# Patient Record
(demographics unavailable — no encounter records)

---

## 2024-11-12 NOTE — ASSESSMENT
[Diabetes Foot Care] : diabetes foot care [Carbohydrate Consistent Diet] : carbohydrate consistent diet [Importance of Diet and Exercise] : importance of diet and exercise to improve glycemic control, achieve weight loss and improve cardiovascular health [Exercise/Effect on Glucose] : exercise/effect on glucose [Hypoglycemia Management] : hypoglycemia management [Action and use of Insulin] : action and use of short and long-acting insulin [Self Monitoring of Blood Glucose] : self monitoring of blood glucose [Insulin Self-Administration] : insulin self-administration

## 2024-11-12 NOTE — ASSESSMENT
[Diabetes Foot Care] : diabetes foot care [Carbohydrate Consistent Diet] : carbohydrate consistent diet [Importance of Diet and Exercise] : importance of diet and exercise to improve glycemic control, achieve weight loss and improve cardiovascular health [Exercise/Effect on Glucose] : exercise/effect on glucose [Hypoglycemia Management] : hypoglycemia management [Action and use of Insulin] : action and use of short and long-acting insulin [Self Monitoring of Blood Glucose] : self monitoring of blood glucose [Insulin Self-Administration] : insulin self-administration Alert and oriented x 3, normal mood and affect, no apparent risk to self or others.

## 2024-11-14 NOTE — PHYSICAL EXAM
[No Acute Distress] : no acute distress [Normal Sclera/Conjunctiva] : normal sclera/conjunctiva [Normal Outer Ear/Nose] : the ears and nose were normal in appearance [Thyroid Not Enlarged] : the thyroid was not enlarged [No Thyroid Nodules] : no palpable thyroid nodules [Clear to Auscultation] : lungs were clear to auscultation bilaterally [Normal Rate] : heart rate was normal [No Edema] : no peripheral edema [Pedal Pulses Normal] : the pedal pulses are present [Soft] : abdomen soft [Spine Straight] : spine straight [No Stigmata of Cushings Syndrome] : no stigmata of Cushings Syndrome [Normal Gait] : normal gait [Normal Strength/Tone] : muscle strength and tone were normal [No Rash] : no rash [Normal Reflexes] : deep tendon reflexes were 2+ and symmetric [Oriented x3] : oriented to person, place, and time [Acanthosis Nigricans] : no acanthosis nigricans [Foot Ulcers] : no foot ulcers [de-identified] : Thyroid not palpated.

## 2024-11-14 NOTE — REVIEW OF SYSTEMS
[Negative] : Heme/Lymph [Muscle Weakness] : muscle weakness [Back Pain] : back pain [Irregular Menses] : regular menses

## 2024-11-14 NOTE — END OF VISIT
[FreeTextEntry3] :  All medical record entries made by the Scribe were at my, Dr. Jakob Alva, direction and personally dictated by me on 11/12/2024. I have reviewed the chart and agree that the record accurately reflects my personal performance of the history, physical exam, assessment and plan. I have also personally directed, reviewed and agreed with the chart. [Time Spent: ___ minutes] : I have spent [unfilled] minutes of time on the encounter which excludes teaching and separately reported services.

## 2024-11-14 NOTE — PHYSICAL EXAM
[No Acute Distress] : no acute distress [Normal Sclera/Conjunctiva] : normal sclera/conjunctiva [Normal Outer Ear/Nose] : the ears and nose were normal in appearance [Thyroid Not Enlarged] : the thyroid was not enlarged [No Thyroid Nodules] : no palpable thyroid nodules [Clear to Auscultation] : lungs were clear to auscultation bilaterally [Normal Rate] : heart rate was normal [No Edema] : no peripheral edema [Pedal Pulses Normal] : the pedal pulses are present [Soft] : abdomen soft [Spine Straight] : spine straight [No Stigmata of Cushings Syndrome] : no stigmata of Cushings Syndrome [Normal Gait] : normal gait [Normal Strength/Tone] : muscle strength and tone were normal [No Rash] : no rash [Normal Reflexes] : deep tendon reflexes were 2+ and symmetric [Oriented x3] : oriented to person, place, and time [Acanthosis Nigricans] : no acanthosis nigricans [Foot Ulcers] : no foot ulcers [de-identified] : Thyroid not palpated.

## 2024-11-14 NOTE — DATA REVIEWED
[FreeTextEntry1] : Scanned Labs: - 12/05/23 LDL-c 52, ACR 20, A1c 6.4%, vit D 25.9 - 06/23/23: Ca 8.6 (8.7-10.2), s.creat 0.87, Total Bilirubin 4.1, LDL-c 35, ACR 3, A1c 6.2%, TSH 1.7

## 2024-11-14 NOTE — HISTORY OF PRESENT ILLNESS
[FreeTextEntry1] : 48 y/o F pt with a Hx of T1DM (dx in 2002; pt is currently on [place] CSII, using DEXCOM). PMHx: Anxiety PSHx: abdominal surgery SHx: Non-smoker, no EtOH Last Ophthalmologist visit: 1.5 yrs ago 2 kids, youngest 6 and a half  06/26/2023 Pt has /70 and BMI 25.18. No significant weight change. CC: "I'm doing okay" She uses Omnipod 5. Pt denies hypoglycemias, blurry vision, and pins and needle sensations. She saw ophthalmologist recently. She has PCP appt later in the summer and podiatrist visit next week. Pt has regular menses.  Pt had labs done recently: - 06/23/23: Ca 8.6 (8.7-10.2), s. creat 0.87, Total Bilirubin 4.1, LDL-c 35, ACR 3, A1c 6.2%, TSH 1.7   12/07/2023 Pt has /80 and BMI 24.6. No significant weight change.  CC: "I am doing well. I am here for my 6 month follow up." Pt reports no physical complaints. She states she is up to date with her optometrist (last visit spring 2023), PCP, and podiatrist. She endorses having hypoglycemias about 2-3 times a week. She reports since she has been on the Omnipod 5 her BS runs higher.  Pt brought in labs:  - 12/05/23 LDL-c 52, ACR 20, A1c 6.4%, vit D 25.9  05/06/2024  Pt has /82 and BMI 24.16. She lost 3 lbs in 5 months. CC: "I'm doing okay." Pt has an appointment scheduled for her ophthalmologist next month and followed up with her PCP late last year. She states that she started Lexapro ~6 weeks ago prescribed by her psychiatrist, which has helped her. Pt reports that hypoglycemias occur very rarely and she drinks orange juice or eats fruit to regulate it.  Pt endorses regular menses. She denies blurred vision, diarrhea, constipation, lightheadedness, and palpitations.  11/12/2024  Pt has /83 and BMI 26.05. She gained 13 lbs in 6 months.  CC: "I'm doing okay," Pt endorses familial stress with her family and kids with learning challenges. She has followed up with an ophthalmologist and has an appointment with her PCP soon. Pt has followed up with a psychiatrist and  since 03/2024. She has been prescribed Lexapro, 20 or 40 mg (doesn't remember).  Pt endorses regular menses, chronic back pain, and some left shoulder weakness (for a few weeks, not associated with back).  She denies pins and needles sensation in her LE. - 10/22/24 s. creat 1.06, eGFR 65, LDL-c 66, A1c 6.9%, TSH 1.74, vit-D 37  [Medications verified as per pt on 11/12/2024] Current Medications: Lyumjev 80 u (On Omnipod 5), Lexapro 10 mg

## 2024-11-14 NOTE — HISTORY OF PRESENT ILLNESS
[FreeTextEntry1] : 48 y/o F pt with a Hx of T1DM (dx in 2002; pt is currently on [place] CSII, using DEXCOM). PMHx: Anxiety PSHx: abdominal surgery SHx: Non-smoker, no EtOH Last Ophthalmologist visit: 1.5 yrs ago 2 kids, youngest 6 and a half  06/26/2023 Pt has /70 and BMI 25.18. No significant weight change. CC: "I'm doing okay" She uses Omnipod 5. Pt denies hypoglycemias, blurry vision, and pins and needle sensations. She saw ophthalmologist recently. She has PCP appt later in the summer and podiatrist visit next week. Pt has regular menses.  Pt had labs done recently: - 06/23/23: Ca 8.6 (8.7-10.2), s. creat 0.87, Total Bilirubin 4.1, LDL-c 35, ACR 3, A1c 6.2%, TSH 1.7   12/07/2023 Pt has /80 and BMI 24.6. No significant weight change.  CC: "I am doing well. I am here for my 6 month follow up." Pt reports no physical complaints. She states she is up to date with her optometrist (last visit spring 2023), PCP, and podiatrist. She endorses having hypoglycemias about 2-3 times a week. She reports since she has been on the Omnipod 5 her BS runs higher.  Pt brought in labs:  - 12/05/23 LDL-c 52, ACR 20, A1c 6.4%, vit D 25.9  05/06/2024  Pt has /82 and BMI 24.16. She lost 3 lbs in 5 months. CC: "I'm doing okay." Pt has an appointment scheduled for her ophthalmologist next month and followed up with her PCP late last year. She states that she started Lexapro ~6 weeks ago prescribed by her psychiatrist, which has helped her. Pt reports that hypoglycemias occur very rarely and she drinks orange juice or eats fruit to regulate it.  Pt endorses regular menses. She denies blurred vision, diarrhea, constipation, lightheadedness, and palpitations.  11/12/2024  Pt has /83 and BMI 26.05. She gained 13 lbs in 6 months.  CC: "I'm doing okay," Pt endorses familial stress with her family and kids with learning challenges. She has followed up with an ophthalmologist and has an appointment with her PCP soon. Pt has followed up with a psychiatrist and  since 03/2024. She has been prescribed Lexapro, 20 or 40 mg (doesn't remember).  Pt endorses regular menses, chronic back pain, and some left shoulder weakness (for a few weeks, not associated with back).  She denies pins and needles sensation in her LE. - 10/22/24 s. creat 1.06, eGFR 65, LDL-c 66, A1c 6.9%, TSH 1.74, vit-D 37  [Medications verified as per pt on 11/12/2024] Current Medications: Lyumjev 80 u (On Omnipod 5), Lexapro 10 mg

## 2025-05-15 NOTE — ADDENDUM
[FreeTextEntry1] : I, Ag Agosto, act solely as a scribe for Dr. Jakob Alva on this date, 05/13/2025

## 2025-05-15 NOTE — REVIEW OF SYSTEMS
[Muscle Weakness] : muscle weakness [Back Pain] : back pain [Negative] : Heme/Lymph [Irregular Menses] : regular menses

## 2025-05-15 NOTE — HISTORY OF PRESENT ILLNESS
[FreeTextEntry1] : 46 y/o F pt with a Hx of T1DM (dx in 2002; pt is currently on [place] CSII, using DEXCOM). PMHx: Anxiety PSHx: abdominal surgery SHx: Non-smoker, no EtOH Last Ophthalmologist visit: 1.5 yrs ago 2 kids, youngest 6 and a half  06/26/2023 Pt has /70 and BMI 25.18. No significant weight change. CC: "I'm doing okay" She uses Omnipod 5. Pt denies hypoglycemias, blurry vision, and pins and needle sensations. She saw ophthalmologist recently. She has PCP appt later in the summer and podiatrist visit next week. Pt has regular menses.  Pt had labs done recently: - 06/23/23: Ca 8.6 (8.7-10.2), s. creat 0.87, Total Bilirubin 4.1, LDL-c 35, ACR 3, A1c 6.2%, TSH 1.7   12/07/2023 Pt has /80 and BMI 24.6. No significant weight change.  CC: "I am doing well. I am here for my 6 month follow up." Pt reports no physical complaints. She states she is up to date with her optometrist (last visit spring 2023), PCP, and podiatrist. She endorses having hypoglycemias about 2-3 times a week. She reports since she has been on the Omnipod 5 her BS runs higher.  Pt brought in labs:  - 12/05/23 LDL-c 52, ACR 20, A1c 6.4%, vit D 25.9  05/06/2024  Pt has /82 and BMI 24.16. She lost 3 lbs in 5 months. CC: "I'm doing okay." Pt has an appointment scheduled for her ophthalmologist next month and followed up with her PCP late last year. She states that she started Lexapro ~6 weeks ago prescribed by her psychiatrist, which has helped her. Pt reports that hypoglycemias occur very rarely and she drinks orange juice or eats fruit to regulate it.  Pt endorses regular menses. She denies blurred vision, diarrhea, constipation, lightheadedness, and palpitations.  11/12/2024  Pt has /83 and BMI 26.05. She gained 13 lbs in 6 months.  CC: "I'm doing okay," Pt endorses familial stress with her family and kids with learning challenges. She has followed up with an ophthalmologist and has an appointment with her PCP soon. Pt has followed up with a psychiatrist and  since 03/2024. She has been prescribed Lexapro, 20 or 40 mg (doesn't remember).  Pt endorses regular menses, chronic back pain, and some left shoulder weakness (for a few weeks, not associated with back).  She denies pins and needles sensation in her LE. - 10/22/24 s. creat 1.06, eGFR 65, LDL-c 66, A1c 6.9%, TSH 1.74, vit-D 37  05/13/25 CC: "I am feeling well. My oldest child is going to summer camp for 1 month." Pt with long standing history of Type 1 DM (well controlled). She is on Omnipod 5 G7/G6 and takes approximately 80 u of insulin daily.  She has been stressed. She is moving next week.  Pt is concerned that she has gained a few pounds.  - 4/17/25 Serum creatinine 0.93, LDL 57, ACR 3, A1C 6.8%, TSH 1.3  [Medications verified as per pt on 11/12/2024] Current Medications: Lyumjev 80 u (On Omnipod 5), Lexapro 10 mg

## 2025-05-15 NOTE — PHYSICAL EXAM
[No Acute Distress] : no acute distress [Normal Sclera/Conjunctiva] : normal sclera/conjunctiva [Normal Outer Ear/Nose] : the ears and nose were normal in appearance [Thyroid Not Enlarged] : the thyroid was not enlarged [No Thyroid Nodules] : no palpable thyroid nodules [Clear to Auscultation] : lungs were clear to auscultation bilaterally [Normal Rate] : heart rate was normal [No Edema] : no peripheral edema [Pedal Pulses Normal] : the pedal pulses are present [Soft] : abdomen soft [Spine Straight] : spine straight [No Stigmata of Cushings Syndrome] : no stigmata of Cushings Syndrome [Normal Gait] : normal gait [Normal Strength/Tone] : muscle strength and tone were normal [No Rash] : no rash [Normal Reflexes] : deep tendon reflexes were 2+ and symmetric [Oriented x3] : oriented to person, place, and time [Acanthosis Nigricans] : no acanthosis nigricans [Foot Ulcers] : no foot ulcers [de-identified] : DP 2+ [de-identified] : Vibratory sense normal, no calluses

## 2025-05-15 NOTE — DATA REVIEWED
[FreeTextEntry1] : Scanned Labs: - 4/17/25 Serum creatinine 0.93, LDL 57, ACR 3, A1C 6.8%, TSH 1.3 - 12/05/23 LDL-c 52, ACR 20, A1c 6.4%, vit D 25.9 - 06/23/23: Ca 8.6 (8.7-10.2), s.creat 0.87, Total Bilirubin 4.1, LDL-c 35, ACR 3, A1c 6.2%, TSH 1.7

## 2025-05-15 NOTE — END OF VISIT
[Time Spent: ___ minutes] : I have spent [unfilled] minutes of time on the encounter which excludes teaching and separately reported services. [FreeTextEntry3] :  All medical record entries made by the Scribe were at my, Dr. Jakob Alva, direction and personally dictated by me on 11/12/2024. I have reviewed the chart and agree that the record accurately reflects my personal performance of the history, physical exam, assessment and plan. I have also personally directed, reviewed and agreed with the chart.

## 2025-05-15 NOTE — PHYSICAL EXAM
[No Acute Distress] : no acute distress [Normal Sclera/Conjunctiva] : normal sclera/conjunctiva [Normal Outer Ear/Nose] : the ears and nose were normal in appearance [Thyroid Not Enlarged] : the thyroid was not enlarged [No Thyroid Nodules] : no palpable thyroid nodules [Clear to Auscultation] : lungs were clear to auscultation bilaterally [Normal Rate] : heart rate was normal [No Edema] : no peripheral edema [Pedal Pulses Normal] : the pedal pulses are present [Soft] : abdomen soft [Spine Straight] : spine straight [No Stigmata of Cushings Syndrome] : no stigmata of Cushings Syndrome [Normal Gait] : normal gait [Normal Strength/Tone] : muscle strength and tone were normal [No Rash] : no rash [Normal Reflexes] : deep tendon reflexes were 2+ and symmetric [Oriented x3] : oriented to person, place, and time [Acanthosis Nigricans] : no acanthosis nigricans [Foot Ulcers] : no foot ulcers [de-identified] : DP 2+ [de-identified] : Vibratory sense normal, no calluses

## 2025-05-15 NOTE — HISTORY OF PRESENT ILLNESS
[FreeTextEntry1] : 46 y/o F pt with a Hx of T1DM (dx in 2002; pt is currently on [place] CSII, using DEXCOM). PMHx: Anxiety PSHx: abdominal surgery SHx: Non-smoker, no EtOH Last Ophthalmologist visit: 1.5 yrs ago 2 kids, youngest 6 and a half  06/26/2023 Pt has /70 and BMI 25.18. No significant weight change. CC: "I'm doing okay" She uses Omnipod 5. Pt denies hypoglycemias, blurry vision, and pins and needle sensations. She saw ophthalmologist recently. She has PCP appt later in the summer and podiatrist visit next week. Pt has regular menses.  Pt had labs done recently: - 06/23/23: Ca 8.6 (8.7-10.2), s. creat 0.87, Total Bilirubin 4.1, LDL-c 35, ACR 3, A1c 6.2%, TSH 1.7   12/07/2023 Pt has /80 and BMI 24.6. No significant weight change.  CC: "I am doing well. I am here for my 6 month follow up." Pt reports no physical complaints. She states she is up to date with her optometrist (last visit spring 2023), PCP, and podiatrist. She endorses having hypoglycemias about 2-3 times a week. She reports since she has been on the Omnipod 5 her BS runs higher.  Pt brought in labs:  - 12/05/23 LDL-c 52, ACR 20, A1c 6.4%, vit D 25.9  05/06/2024  Pt has /82 and BMI 24.16. She lost 3 lbs in 5 months. CC: "I'm doing okay." Pt has an appointment scheduled for her ophthalmologist next month and followed up with her PCP late last year. She states that she started Lexapro ~6 weeks ago prescribed by her psychiatrist, which has helped her. Pt reports that hypoglycemias occur very rarely and she drinks orange juice or eats fruit to regulate it.  Pt endorses regular menses. She denies blurred vision, diarrhea, constipation, lightheadedness, and palpitations.  11/12/2024  Pt has /83 and BMI 26.05. She gained 13 lbs in 6 months.  CC: "I'm doing okay," Pt endorses familial stress with her family and kids with learning challenges. She has followed up with an ophthalmologist and has an appointment with her PCP soon. Pt has followed up with a psychiatrist and  since 03/2024. She has been prescribed Lexapro, 20 or 40 mg (doesn't remember).  Pt endorses regular menses, chronic back pain, and some left shoulder weakness (for a few weeks, not associated with back).  She denies pins and needles sensation in her LE. - 10/22/24 s. creat 1.06, eGFR 65, LDL-c 66, A1c 6.9%, TSH 1.74, vit-D 37  05/13/25 CC: "I am feeling well. My oldest child is going to summer camp for 1 month." Pt with long standing history of Type 1 DM (well controlled). She is on Omnipod 5 G7/G6 and takes approximately 80 u of insulin daily.  She has been stressed. She is moving next week.  Pt is concerned that she has gained a few pounds.  - 4/17/25 Serum creatinine 0.93, LDL 57, ACR 3, A1C 6.8%, TSH 1.3  [Medications verified as per pt on 11/12/2024] Current Medications: Lyumjev 80 u (On Omnipod 5), Lexapro 10 mg

## 2025-07-08 NOTE — HISTORY OF PRESENT ILLNESS
[FreeTextEntry1] : 48 y/o female with h/o type 1 dm, anxiety/ptsd, overweight, gestational htn who presents for initial evaluation today    no cp, sob, syncope, lh, palpitations, orthopnea, pnd, edema notes fatigue new in past year   sees endo for DM - diagnosed 2002 - considering glp   had gestational htn with second pregnancy  walks for exercise diet could be improved stress high  sees therapist/psych for anxiety/ptsd - has been on lexapro for past year  sleeps 6-7 hours    PMH/PSH: type 1 dm anxiety/ptsd abdominal surgery 2002 s/p deni vit d deficiency nasal surgery overweight IVF finger surgery LEEP gestational htn elevated bili  ALL: sulfa - hives   MEDS: insulin pump mvi lexapro 40 mg qhs  SH: no tobacco social etoh no drugs  2 sons - age 10 - adhd/anxiety/ocd, 7 - ASD/adhd from [place], 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

## 2025-07-08 NOTE — DISCUSSION/SUMMARY
[Patient] : the patient [EKG obtained to assist in diagnosis and management of assessed problem(s)] : EKG obtained to assist in diagnosis and management of assessed problem(s) [___ Month(s)] : in [unfilled] month(s) [FreeTextEntry1] : 48 y/o female with h/o type 1 dm, anxiety/ptsd, overweight, gestational htn who presents for initial evaluation today  -ekg ordered - nsr, normal intervals, no st/t changes -labs 2025 reviewed, labs ordered today -CTA cor ordered for fatigue -echo for fatigue -endo f/up for DM -statin recommended - will start crestor 5 mg qhs -sleep MD referral for fatigue -counseled on cvd risk factrors -f/up 2-3 months for lipids  I have spent 60 minutes reviewing labs, records, tests and discussed cvd risk factors